# Patient Record
Sex: FEMALE | Race: WHITE | NOT HISPANIC OR LATINO | URBAN - METROPOLITAN AREA
[De-identification: names, ages, dates, MRNs, and addresses within clinical notes are randomized per-mention and may not be internally consistent; named-entity substitution may affect disease eponyms.]

---

## 2020-06-28 ENCOUNTER — TELEPHONE ENCOUNTER (OUTPATIENT)
Dept: URBAN - METROPOLITAN AREA CLINIC 92 | Facility: CLINIC | Age: 70
End: 2020-06-28

## 2020-06-28 PROBLEM — 64226004: Status: ACTIVE | Noted: 2020-06-28

## 2020-06-28 RX ORDER — BUDESONIDE 3 MG/1
TAKE 3 CAPSULES (9 MG) BY ORAL ROUTE ONCE DAILY IN THE MORNING FOR FOR 56 DAYS CAPSULE, COATED PELLETS ORAL 1
Start: 2020-05-08

## 2020-07-06 ENCOUNTER — TELEPHONE ENCOUNTER (OUTPATIENT)
Dept: URBAN - METROPOLITAN AREA CLINIC 19 | Facility: CLINIC | Age: 70
End: 2020-07-06

## 2020-08-30 ENCOUNTER — TELEPHONE ENCOUNTER (OUTPATIENT)
Dept: URBAN - METROPOLITAN AREA CLINIC 19 | Facility: CLINIC | Age: 70
End: 2020-08-30

## 2020-08-30 RX ORDER — BUDESONIDE 3 MG/1
TAKE 3 CAPSULES DAILY X 56 DAYS, THEN 2 CAPS/D X 56D, THEN 1 CAP/D X 56D, THEN STOP CAPSULE, COATED PELLETS ORAL ONCE A DAY
Qty: 336 | Refills: 0
Start: 2020-05-08

## 2020-09-01 ENCOUNTER — TELEPHONE ENCOUNTER (OUTPATIENT)
Dept: URBAN - METROPOLITAN AREA CLINIC 19 | Facility: CLINIC | Age: 70
End: 2020-09-01

## 2020-09-01 RX ORDER — BUDESONIDE 3 MG/1
TAKE 3 CAPSULES DAILY X 56 DAYS, THEN 2 CAPS/D X 56D, THEN 1 CAP/D X 56D, THEN STOP CAPSULE, COATED PELLETS ORAL ONCE A DAY
Qty: 336 | Refills: 0

## 2020-09-17 ENCOUNTER — OFFICE VISIT (OUTPATIENT)
Dept: URBAN - METROPOLITAN AREA CLINIC 128 | Facility: CLINIC | Age: 70
End: 2020-09-17

## 2020-09-29 ENCOUNTER — OFFICE VISIT (OUTPATIENT)
Dept: URBAN - METROPOLITAN AREA CLINIC 19 | Facility: CLINIC | Age: 70
End: 2020-09-29
Payer: MEDICARE

## 2020-09-29 ENCOUNTER — TELEPHONE ENCOUNTER (OUTPATIENT)
Dept: URBAN - METROPOLITAN AREA CLINIC 92 | Facility: CLINIC | Age: 70
End: 2020-09-29

## 2020-09-29 DIAGNOSIS — R19.7 DIARRHEA: ICD-10-CM

## 2020-09-29 DIAGNOSIS — R15.9 FECAL INCONTINENCE: ICD-10-CM

## 2020-09-29 DIAGNOSIS — M62.89 PELVIC FLOOR DYSFUNCTION: ICD-10-CM

## 2020-09-29 PROCEDURE — 3017F COLORECTAL CA SCREEN DOC REV: CPT | Performed by: INTERNAL MEDICINE

## 2020-09-29 PROCEDURE — 99215 OFFICE O/P EST HI 40 MIN: CPT | Performed by: INTERNAL MEDICINE

## 2020-09-29 PROCEDURE — G8417 CALC BMI ABV UP PARAM F/U: HCPCS | Performed by: INTERNAL MEDICINE

## 2020-09-29 PROCEDURE — 99213 OFFICE O/P EST LOW 20 MIN: CPT | Performed by: INTERNAL MEDICINE

## 2020-09-29 PROCEDURE — G8427 DOCREV CUR MEDS BY ELIG CLIN: HCPCS | Performed by: INTERNAL MEDICINE

## 2020-09-29 RX ORDER — BUDESONIDE 3 MG/1
TAKE 3 CAPSULES DAILY X 56 DAYS, THEN 2 CAPS/D X 56D, THEN 1 CAP/D X 56D, THEN STOP CAPSULE, COATED PELLETS ORAL ONCE A DAY
Qty: 336 | Refills: 0 | Status: ACTIVE | COMMUNITY

## 2020-09-29 RX ORDER — DICYCLOMINE HYDROCHLORIDE 10 MG/1
TAKE 1 CAPSULE BY MOUTH TWICE DAILY FOR 90 DAYS CAPSULE ORAL
Qty: 180 | Refills: 3 | Status: ACTIVE | COMMUNITY
Start: 2020-02-24 | End: 2021-02-18

## 2020-09-29 RX ORDER — DULOXETINE 60 MG/1
TAKE 1 CAPSULE (60 MG) BY ORAL ROUTE ONCE DAILY FOR 90 DAYS CAPSULE, DELAYED RELEASE PELLETS ORAL 1
Qty: 90 | Refills: 3 | Status: ACTIVE | COMMUNITY
Start: 2019-11-13 | End: 2020-11-07

## 2020-09-29 RX ORDER — OMEPRAZOLE 20 MG/1
TAKE 1 CAPSULE (20 MG) BY ORAL ROUTE ONCE DAILY BEFORE A MEAL FOR 90 DAYS CAPSULE, DELAYED RELEASE ORAL 1
Qty: 90 | Refills: 3 | Status: ACTIVE | COMMUNITY
Start: 2019-11-13 | End: 2020-11-07

## 2020-09-29 RX ORDER — ATORVASTATIN CALCIUM 20 MG/1
TAKE 1 TABLET (20 MG) BY ORAL ROUTE ONCE DAILY TABLET, FILM COATED ORAL 1
Qty: 0 | Refills: 0 | Status: ACTIVE | COMMUNITY
Start: 1900-01-01

## 2020-09-29 RX ORDER — SOY PROTEIN
POWDER (GRAM) ORAL
Qty: 0 | Refills: 0 | Status: ACTIVE | COMMUNITY
Start: 1900-01-01

## 2020-09-29 RX ORDER — MONTELUKAST SODIUM 10 MG/1
TAKE 1 TABLET (10 MG) BY ORAL ROUTE ONCE DAILY IN THE EVENING TABLET, FILM COATED ORAL 1
Qty: 0 | Refills: 0 | Status: ACTIVE | COMMUNITY
Start: 1900-01-01

## 2020-09-29 RX ORDER — MECLIZINE HYDROCHLORIDE 25 MG/1
TAKE 1 TABLET (25 MG) BY ORAL ROUTE ONCE DAILY AS NEEDED FOR 90 DAYS TABLET ORAL 1
Qty: 90 | Refills: 3 | Status: ACTIVE | COMMUNITY
Start: 2019-11-13 | End: 2020-11-07

## 2020-09-29 NOTE — HPI-TODAY'S VISIT:
Ms. Malave is a 70-year-old woman here for a second opinion regarding her GI issues-referred by Dr. Shilo Osborne.  She has a very complicated GI history with multiple procedures and evaluation so far which I will summarize here. She has a history of lymphocytic colitis when she presented with cramping and diarrhea diagnosed in 2012 by Dr. Knapp.  She was treated for that and she improved.  Subsequently she had recurrent episodes of sigmoid diverticulitis in 2017 and 2018.  This change the course of her bowel symptoms and she is more constipated.  She was under the care of Dr. Castillo and was being treated with Trulance and Cymbalta for IBS-C. She then switched her care to Dr. Shilo Osborne after she was hospitalized for sigmoid diverticulitis in late 2018.  In early 2019, she underwent total abdominal hysterectomy.  Since then she states her symptoms have changed again and she started having significant diarrhea with significant lower abdominal cramping as well.  She was diagnosed with IBS-D at this point and had been treated by Dr. Guzman with Xifaxan dicyclomine.  She also had recurrent urinary tract infections for which she required recurrent courses of antibiotics.  With all these issues, her lower abdominal symptoms have become worse.  She reports anywhere from 4-7 bowel movements a day on average some liquid some semiformed with no blood or mucus.  If she has to leave her house she is requiring Loperamide at this point.  Since her symptoms did not get better Dr. Osborne gave her a course of budesonide which worked for a short period and then stopped working.  Currently she is taking to 3 mg resonates a day.  Both Xifaxan and Viberzi did not work.  She reports significant bloating and gas at this time. In the interim she has undergone several endoscopic as well as nonendoscopic GI evaluations including EGD and colonoscopy in May 2019 that were normal including random colon biopsies with no evidence of any lymphocytic colitis.  CT enterography was done which was normal.  Fecal fat fecal elastase were normal.  A PillCam was done which was normal.  The chromogranin A level came back elevated.  Hence a CT NETSPOT was performed which was negative for any lesions.  She denies any weight loss during this.  She is quite frustrated about her GI symptoms and is here to see if there are any other alternatives.

## 2020-09-29 NOTE — PHYSICAL EXAM CONSTITUTIONAL:
well developed, well nourished , anxious appearing.  in no acute distress , ambulating without difficulty , normal communication ability

## 2020-10-28 ENCOUNTER — TELEPHONE ENCOUNTER (OUTPATIENT)
Dept: URBAN - METROPOLITAN AREA CLINIC 19 | Facility: CLINIC | Age: 70
End: 2020-10-28

## 2020-11-23 ENCOUNTER — TELEPHONE ENCOUNTER (OUTPATIENT)
Dept: URBAN - METROPOLITAN AREA CLINIC 19 | Facility: CLINIC | Age: 70
End: 2020-11-23

## 2020-11-23 RX ORDER — DULOXETINE 60 MG/1
TAKE 1 CAPSULE (60 MG) BY ORAL ROUTE ONCE DAILY FOR 90 DAYS CAPSULE, DELAYED RELEASE PELLETS ORAL 1
Qty: 90 | Refills: 3
Start: 2019-11-13

## 2020-12-04 ENCOUNTER — OFFICE VISIT (OUTPATIENT)
Dept: URBAN - METROPOLITAN AREA MEDICAL CENTER 28 | Facility: MEDICAL CENTER | Age: 70
End: 2020-12-04
Payer: MEDICARE

## 2020-12-04 DIAGNOSIS — R15.9 ANAL SPHINCTER INCONTINENCE: ICD-10-CM

## 2020-12-04 PROCEDURE — 91122 ANAL PRESSURE RECORD: CPT | Performed by: INTERNAL MEDICINE

## 2020-12-04 PROCEDURE — 91120 RECTAL SENSATION TEST: CPT | Performed by: INTERNAL MEDICINE

## 2021-01-08 ENCOUNTER — TELEPHONE ENCOUNTER (OUTPATIENT)
Dept: URBAN - METROPOLITAN AREA CLINIC 19 | Facility: CLINIC | Age: 71
End: 2021-01-08

## 2021-02-03 ENCOUNTER — ERX REFILL RESPONSE (OUTPATIENT)
Age: 71
End: 2021-02-03

## 2021-02-03 RX ORDER — DICYCLOMINE HYDROCHLORIDE 10 MG/1
TAKE 1 CAPSULE TWICE DAILY CAPSULE ORAL
Qty: 180 | Refills: 3

## 2021-04-12 ENCOUNTER — TELEPHONE ENCOUNTER (OUTPATIENT)
Dept: URBAN - METROPOLITAN AREA CLINIC 92 | Facility: CLINIC | Age: 71
End: 2021-04-12

## 2021-04-19 ENCOUNTER — OFFICE VISIT (OUTPATIENT)
Dept: URBAN - METROPOLITAN AREA CLINIC 19 | Facility: CLINIC | Age: 71
End: 2021-04-19
Payer: MEDICARE

## 2021-04-19 ENCOUNTER — WEB ENCOUNTER (OUTPATIENT)
Dept: URBAN - METROPOLITAN AREA CLINIC 19 | Facility: CLINIC | Age: 71
End: 2021-04-19

## 2021-04-19 ENCOUNTER — DASHBOARD ENCOUNTERS (OUTPATIENT)
Age: 71
End: 2021-04-19

## 2021-04-19 DIAGNOSIS — Z86.010 HISTORY OF COLON POLYPS: ICD-10-CM

## 2021-04-19 DIAGNOSIS — R14.0 BLOATING: ICD-10-CM

## 2021-04-19 DIAGNOSIS — M62.89 PELVIC FLOOR DYSFUNCTION IN FEMALE: ICD-10-CM

## 2021-04-19 DIAGNOSIS — R10.30 LOWER ABDOMINAL PAIN: ICD-10-CM

## 2021-04-19 DIAGNOSIS — R15.9 FECAL INCONTINENCE: ICD-10-CM

## 2021-04-19 DIAGNOSIS — R19.7 DIARRHEA: ICD-10-CM

## 2021-04-19 DIAGNOSIS — R10.13 EPIGASTRIC PAIN: ICD-10-CM

## 2021-04-19 DIAGNOSIS — K57.32 SIGMOID DIVERTICULITIS: ICD-10-CM

## 2021-04-19 DIAGNOSIS — R11.0 NAUSEA: ICD-10-CM

## 2021-04-19 DIAGNOSIS — K58.9 IBS (IRRITABLE BOWEL SYNDROME): ICD-10-CM

## 2021-04-19 PROCEDURE — 99214 OFFICE O/P EST MOD 30 MIN: CPT | Performed by: INTERNAL MEDICINE

## 2021-04-19 RX ORDER — ATORVASTATIN CALCIUM 20 MG/1
TAKE 1 TABLET (20 MG) BY ORAL ROUTE ONCE DAILY TABLET, FILM COATED ORAL 1
Qty: 0 | Refills: 0 | Status: ACTIVE | COMMUNITY
Start: 1900-01-01

## 2021-04-19 RX ORDER — ONDANSETRON 8 MG/1
1 TABLET ON THE TONGUE AND ALLOW TO DISSOLVE  AS NEEDED TABLET, ORALLY DISINTEGRATING ORAL
Qty: 90 | Refills: 5 | OUTPATIENT
Start: 2021-04-19

## 2021-04-19 RX ORDER — DULOXETINE 60 MG/1
TAKE 1 CAPSULE (60 MG) BY ORAL ROUTE ONCE DAILY FOR 90 DAYS CAPSULE, DELAYED RELEASE PELLETS ORAL 1
Qty: 90 | Refills: 3 | Status: ACTIVE | COMMUNITY
Start: 2019-11-13

## 2021-04-19 RX ORDER — SOY PROTEIN
POWDER (GRAM) ORAL
Qty: 0 | Refills: 0 | Status: ACTIVE | COMMUNITY
Start: 1900-01-01

## 2021-04-19 RX ORDER — MONTELUKAST SODIUM 10 MG/1
TAKE 1 TABLET (10 MG) BY ORAL ROUTE ONCE DAILY IN THE EVENING TABLET, FILM COATED ORAL 1
Qty: 0 | Refills: 0 | Status: ACTIVE | COMMUNITY
Start: 1900-01-01

## 2021-04-19 RX ORDER — TRAMADOL HYDROCHLORIDE 50 MG/1
1-2 TABLETS TABLET, FILM COATED ORAL
Qty: 120 | Refills: 2 | OUTPATIENT
Start: 2021-04-19 | End: 2021-07-18

## 2021-04-19 RX ORDER — BUDESONIDE 3 MG/1
3 CAPSULES CAPSULE, COATED PELLETS ORAL ONCE A DAY
Qty: 252 CAPSULE | Refills: 0

## 2021-04-19 RX ORDER — DICYCLOMINE HYDROCHLORIDE 10 MG/1
TAKE 1 CAPSULE TWICE DAILY CAPSULE ORAL
Qty: 180 | Refills: 3 | Status: ACTIVE | COMMUNITY

## 2021-04-19 RX ORDER — BUDESONIDE 3 MG/1
TAKE 3 CAPSULES DAILY X 56 DAYS, THEN 2 CAPS/D X 56D, THEN 1 CAP/D X 56D, THEN STOP CAPSULE, COATED PELLETS ORAL ONCE A DAY
Qty: 336 | Refills: 0 | Status: ACTIVE | COMMUNITY

## 2021-04-19 NOTE — HPI-TODAY'S VISIT:
3/8/19: Patient, who was previously a patient of Dr. Delfino Us (retired), Dr. Kellie Knapp (retired), and Dr. Aayush Castillo, presented to the hospital with abdominal pain. She was diagnosed with uncomplicated sigmoid diverticulitis. She had been told previously that she had ulcerative colitis - not confirmed. She had two colonoscopies in 2012/2013 by Dr. Knapp that confirmed the diagnosis of lymphocytic colitis. She moved to [place] with her  but has recently moved to the [facility] in [place]. She had been followed by Dr. Aayush Castillo until her recent hospitalization - records to be reviewed. Patient had a lot of questions about diverticulitis and exhibits an overwhelming anxiety about her intermittent lower abdominal cramping and the possibility of recurrent diverticulitis. I suspect that she has an element of IBS-D, and we discussed all of the issues surrounding this diagnosis. She had been taking Trulance through Dr. Castillo's office until recently for IBS-C. She was recently started on Cymbalta as well.   5/6/19: Patient returns for reevaluation - she is having issues with abdominal pain at this time and wants a colonoscopy and EGD. Dr. Castillo's records were reviewed - he did her last colonoscopy in 2016 and found one polyp. He recommended a repeat colonoscopy in 3 years. Patient is also complaining of bloating and epigastric pain. She never went to [place] - her mother got sick and was admitted to [facility] for CHF/respiratory issues for over a week. Doing better now.   8/27/19: Patient now on daily MiraLax (1 capful/day), duloxetine (Cymbalta) daily, Florastor 1 capsule daily, dicyclomine 10 mg po BID, and omeprazole 20 mg daily. This regimen controls her upper symptoms relatively well; however, she still has bowel urgency with increased frequency but small volume. Patient saw our dietitian (Pepper), and they discussed the lowFODMAP diet.   11/13/19: Patient returns for reevaluation of her functional dyspepsia/IBS issues. Xifaxan ordered at last visit for IBS-D. Her diarrhea resolved; now she has problems with constipation. Taking daily MiraLax, but this can cause her stools to be loose. She takes an occasional suppository. Complains of flatulence and lower abdominal pain. Stress definitely increases the pain. She takes omeprazole every day for good control of her heartburn symptoms. She lost 10 pounds but gained it all back.   12/19/19: Patient returns for reevaluation of her issues. EGD/colonoscopy on 5/28/19 was unremarkable. Needs repeat colonoscopy in 5 years. She comes in today urgently because she has a RLQ pain for a week that is currently not severe but waxes and wanes in intensity. I suspect a flare of her IBS, and the dicyclomine seems to help.   2/26/20: Patient returns for reevaluation of her IBS. She is currently on MiraLax 1 cap/day, dicyclomine 2/day, duloxetine 60 mg/day, and Florastor 2 caps/day. She is interested in trying Xifaxan again. Went to ER recently for abdominal pain - no cause found based on labs/CT scan, but her WBC was elevated. She was told that she had a UTI, and she was placed on antibiotics. Saw cardiologist recently with abnormal treadmill stress test - she is supposed to get a nuclear stress test on 3/3/20. She still complains of a lower abdominal pain that is relieved with a BM. Spent a long time discussed her issues getting Xifaxan - even with insurance coverage, it can cost over $500 out of pocket. She also was told that her dietitian visit (along with 2 additional visits) would be covered "completely" by insurance; after the insurance covered the majority of the visit, she was sent a bill for $110 that she does not want to pay for. I told her that I would put her in touch with the appropriate people.   3/30/20 (Telephone Visit): Patient has had continued issues with bowel urgency and cramping since stopping the Xifaxan after the full 14 days. She had 1-2 days of relief, but she states that she has 4 to 7 BMs/day on average, but she can sometimes have 2 BMs and sometimes 8+ BMs. No blood in stool - possible mucus? Lower abdominal cramping is still a problem, mainly at night. Loperamide does slow her BMs down; she has not tried Viberzi yet. I spent 15 minutes discussing these problems with the patient - she agreed to take samples of Viberzi.  4/7/20: Patient presents via Telehealth (due to the Coronavirus pandemic) to discuss her issues. She just got the samples - there were issues getting them since the 100mg tablets were only available at our [place] office. In either case, she has not started them, and we spent 20 minutes discussing potential side effects of the drugs, the natural history of IBS, and other potential causes for her symptoms. She still has "explosive" bowel movements. She is not taking MiraLax, although she took it once when her diarrhea appeared to be slowing down and she was feeling "constipated" - this caused her diarrhea to come back. She also takes multivitamins and vitamin D supplements - we discussed stopping those. We agreed that starting the Viberzi makes sense now - if it causes side effects or is ineffective, then there are battery of tests that should be done, including EGD, colonoscopy, CT enterography, PillCam, chromogranin A, fecal elastase, fecal fat, etc.   4/13/20: Patient presents for reevaluation of her symptoms via telephone (due to the Coronavirus pandemic). She took the Viberzi samples until 4/10/20, and she apparently was having abdominal pains and called the office for urgent consultation, which was arranged for today. Her pain is mainly in the lower abdomen, and it occurs spontaneously before having one of her "explosive" bowel movements. She only has 1-2 BMs/day, but they are extremely upsetting to her. I told her that she now needs a more extensive work-up and to stop taking the Viberzi (she stopped yesterday). Patient noted that Viberzi helped with gas, but the abdominal pain was worse, and she still had diarrhea. Xifaxan worsened the gas, but the pain was lessened.   5/8/20: Patient returns via Telemed to discuss her GI symptoms. After her last visit, we discussed the results of her laboratory testing. The only abnormality was an elevated chromogranin A. I followed that with a CT-PET with NETSPOT that was negative. Given the entire clinical picture, I think that an occult NET is extremely unlikely. She has started taking budesonide for possible microscopic colitis - it was previously diagnosed by Dr. Knapp but subsequent colonoscopy with biopsies was negative - and she was concerned initially because she had excessive diaphoresis. I was not aware of a strong association with budesonide and this potential side effect. I told her to keep taking the medication, and we will monitor her response. We also considered another round of Xifaxan, but she wanted to try the steroid first. She is doing "75% better" with less frequency and urgency. Stools are more formed.  9/29/20 (Dr. Carrie Jeffery):  Ms. Malave is a 70-year-old woman here for a second opinion regarding her GI issues-referred by Dr. Shilo Osborne.  She has a very complicated GI history with multiple procedures and evaluation so far which I will summarize here.  She has a history of lymphocytic colitis when she presented with cramping and diarrhea diagnosed in 2012 by Dr. Knapp.  She was treated for that and she improved.  Subsequently she had recurrent episodes of sigmoid diverticulitis in 2017 and 2018.  This change the course of her bowel symptoms and she is more constipated.  She was under the care of Dr. Castillo and was being treated with Trulance and Cymbalta for IBS-C.  She then switched her care to Dr. Shilo Osborne after she was hospitalized for sigmoid diverticulitis in late 2018.  In early 2019, she underwent total abdominal hysterectomy.  Since then she states her symptoms have changed again and she started having significant diarrhea with significant lower abdominal cramping as well.  She was diagnosed with IBS-D at this point and had been treated by Dr. Guzman with Xifaxan dicyclomine.  She also had recurrent urinary tract infections for which she required recurrent courses of antibiotics.  With all these issues, her lower abdominal symptoms have become worse.  She reports anywhere from 4-7 bowel movements a day on average some liquid some semiformed with no blood or mucus.  If she has to leave her house she is requiring Loperamide at this point.  Since her symptoms did not get better Dr. Osborne gave her a course of budesonide which worked for a short period and then stopped working.  Currently she is taking to 3 mg resonates a day.  Both Xifaxan and Viberzi did not work.  She reports significant bloating and gas at this time.  In the interim she has undergone several endoscopic as well as nonendoscopic GI evaluations including EGD and colonoscopy in May 2019 that were normal including random colon biopsies with no evidence of any lymphocytic colitis.  CT enterography was done which was normal.  Fecal fat fecal elastase were normal.  A PillCam was done which was normal.  The chromogranin A level came back elevated.  Hence a CT NETSPOT was performed which was negative for any lesions.  She denies any weight loss during this.  She is quite frustrated about her GI symptoms and is here to see if there are any other alternatives.  4/19/21:  Patient returns to see me for urgent consultation.  After seeing my colleague for a second opinion (clinic visit took almost one hour), she underwent anorectal manometry which showed evidence of pelvic floor dysfunction with decreased rectal compliance and inability to expel the balloon.  She was somewhat confused about whether to proceed with pelvic floor therapy +/- biofeedback, so despite our recommendation to do so, she has not pursued that.  Interestingly enough, she was doing well on budesonide, and about 4-5 days ago, she started having more lower abdominal cramping with 2-3 loose BMs with urgency.  She also complains of nausea and 5-pound weight loss.  She says that she just "feels drained".

## 2021-04-20 ENCOUNTER — OFFICE VISIT (OUTPATIENT)
Dept: URBAN - METROPOLITAN AREA CLINIC 19 | Facility: CLINIC | Age: 71
End: 2021-04-20

## 2021-04-20 ENCOUNTER — TELEPHONE ENCOUNTER (OUTPATIENT)
Dept: URBAN - METROPOLITAN AREA CLINIC 19 | Facility: CLINIC | Age: 71
End: 2021-04-20

## 2021-04-21 ENCOUNTER — TELEPHONE ENCOUNTER (OUTPATIENT)
Dept: URBAN - METROPOLITAN AREA CLINIC 19 | Facility: CLINIC | Age: 71
End: 2021-04-21

## 2021-04-25 ENCOUNTER — TELEPHONE ENCOUNTER (OUTPATIENT)
Dept: URBAN - METROPOLITAN AREA CLINIC 19 | Facility: CLINIC | Age: 71
End: 2021-04-25

## 2021-04-25 PROBLEM — 129565002 DISORDER OF SKELETAL AND/OR SMOOTH MUSCLE: Status: ACTIVE | Noted: 2021-04-25

## 2021-04-25 PROBLEM — 422587007 NAUSEA: Status: ACTIVE | Noted: 2021-04-19

## 2021-04-25 PROBLEM — 72042002 BOWEL INCONTINENCE: Status: ACTIVE | Noted: 2020-09-29

## 2021-04-26 ENCOUNTER — TELEPHONE ENCOUNTER (OUTPATIENT)
Dept: URBAN - METROPOLITAN AREA SURGERY CENTER 30 | Facility: SURGERY CENTER | Age: 71
End: 2021-04-26

## 2021-04-26 RX ORDER — BUDESONIDE 3 MG/1
3 CAPSULES CAPSULE, COATED PELLETS ORAL ONCE A DAY
Qty: 252 CAPSULE | Refills: 0

## 2021-04-30 ENCOUNTER — LAB OUTSIDE AN ENCOUNTER (OUTPATIENT)
Dept: URBAN - METROPOLITAN AREA CLINIC 19 | Facility: CLINIC | Age: 71
End: 2021-04-30

## 2021-04-30 LAB
CREATININE POC: 0.9
PERFORMING LAB: (no result)

## 2021-05-06 ENCOUNTER — TELEPHONE ENCOUNTER (OUTPATIENT)
Dept: URBAN - METROPOLITAN AREA CLINIC 19 | Facility: CLINIC | Age: 71
End: 2021-05-06

## 2021-07-19 ENCOUNTER — OFFICE VISIT (OUTPATIENT)
Dept: URBAN - METROPOLITAN AREA CLINIC 19 | Facility: CLINIC | Age: 71
End: 2021-07-19

## 2021-10-06 ENCOUNTER — TELEPHONE ENCOUNTER (OUTPATIENT)
Dept: URBAN - METROPOLITAN AREA CLINIC 19 | Facility: CLINIC | Age: 71
End: 2021-10-06

## 2021-10-06 RX ORDER — TRAMADOL HYDROCHLORIDE 50 MG/1
1-2 TABLETS TABLET, FILM COATED ORAL
Qty: 120 | Refills: 2
Start: 2021-04-19 | End: 2022-01-04

## 2022-07-27 ENCOUNTER — ERX REFILL RESPONSE (OUTPATIENT)
Dept: URBAN - METROPOLITAN AREA CLINIC 19 | Facility: CLINIC | Age: 72
End: 2022-07-27

## 2022-07-27 RX ORDER — DICYCLOMINE HYDROCHLORIDE 10 MG/1
TAKE 1 CAPSULE TWICE DAILY CAPSULE ORAL
Qty: 180 | Refills: 3 | OUTPATIENT

## 2022-07-27 RX ORDER — DICYCLOMINE HYDROCHLORIDE 10 MG/1
TAKE 1 CAPSULE TWICE DAILY CAPSULE ORAL
Qty: 180 CAPSULE | Refills: 3 | OUTPATIENT

## 2023-12-20 ENCOUNTER — TELEPHONE ENCOUNTER (OUTPATIENT)
Dept: URBAN - METROPOLITAN AREA CLINIC 63 | Facility: CLINIC | Age: 73
End: 2023-12-20